# Patient Record
Sex: MALE | Employment: UNEMPLOYED | URBAN - METROPOLITAN AREA
[De-identification: names, ages, dates, MRNs, and addresses within clinical notes are randomized per-mention and may not be internally consistent; named-entity substitution may affect disease eponyms.]

---

## 2019-08-21 ENCOUNTER — HOSPITAL ENCOUNTER (EMERGENCY)
Facility: CLINIC | Age: 54
Discharge: HOME | End: 2019-08-21
Attending: EMERGENCY MEDICINE | Admitting: EMERGENCY MEDICINE

## 2019-08-21 VITALS
SYSTOLIC BLOOD PRESSURE: 154 MMHG | RESPIRATION RATE: 20 BRPM | OXYGEN SATURATION: 100 % | HEART RATE: 82 BPM | TEMPERATURE: 98.3 F | DIASTOLIC BLOOD PRESSURE: 106 MMHG

## 2019-08-21 DIAGNOSIS — H66.90 ACUTE OTITIS MEDIA, UNSPECIFIED OTITIS MEDIA TYPE: ICD-10-CM

## 2019-08-21 PROCEDURE — 99282 EMERGENCY DEPT VISIT SF MDM: CPT

## 2019-08-21 RX ORDER — CODEINE PHOSPHATE AND GUAIFENESIN 10; 100 MG/5ML; MG/5ML
2 SOLUTION ORAL EVERY 4 HOURS PRN
Qty: 120 ML | Refills: 0 | Status: SHIPPED | OUTPATIENT
Start: 2019-08-21

## 2019-08-21 RX ORDER — AMOXICILLIN 500 MG/1
500 CAPSULE ORAL 3 TIMES DAILY
Qty: 21 CAPSULE | Refills: 0 | Status: SHIPPED | OUTPATIENT
Start: 2019-08-21 | End: 2019-08-28

## 2019-08-21 ASSESSMENT — ENCOUNTER SYMPTOMS
SORE THROAT: 0
VOMITING: 0
BACK PAIN: 0
NAUSEA: 0
COUGH: 1
FEVER: 0

## 2019-08-21 NOTE — ED AVS SNAPSHOT
[facility] Emergency Department  [address]  [facility] [address]  Phone:  [phone]  Fax:  [phone]                                    Anibal Sanz   MRN: [id number]    Department:  [facility] Emergency Department   Date of Visit:  8/21/2019           After Visit Summary Signature Page    I have received my discharge instructions, and my questions have been answered. I have discussed any challenges I see with this plan with the nurse or doctor.    ..........................................................................................................................................  Patient/Patient Representative Signature      ..........................................................................................................................................  Patient Representative Print Name and Relationship to Patient    ..................................................               ................................................  Date                                   Time    ..........................................................................................................................................  Reviewed by Signature/Title    ...................................................              ..............................................  Date                                               Time          22EPIC Rev 08/18

## 2019-08-21 NOTE — ED PROVIDER NOTES
"  History     Chief Complaint:  Ear Fullness and Cough    The history is provided by the patient.      Anibal Sanz is a 54 year old male who presents with ear fullness and cough. Nine days ago (8/12/2019), patient flew into [place] from [facility] and states he had a sudden onset of left ear fullness and a cough with post-nasal drip a few days ago. Patient has tried saline drops and Flonase that has helped after his symptoms worsened. However, patient flies back to [place] in two weeks and would like to resolve his issues, prompting him to the ED. Here, patient denies chest pain, back pain, sore throat, fever, nausea, or vomiting. He denies congestion or illness before his flight here. No history of sinusitis. 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.     Allergies:  NKDA     Medications:    The patient is not currently taking any prescribed medications.    Past Medical History:    The patient denies any significant past medical history.    Past Surgical History:    The patient does not have any pertinent past surgical history.    Family History:    No past pertinent family history.    Social History:  Patient is from [facility].   Occupation: Ophthalmologist.      Review of Systems   Constitutional: Negative for fever.   HENT: Positive for postnasal drip. Negative for sore throat.         Positive for ear \"fullness\"     Respiratory: Positive for cough.    Cardiovascular: Negative for chest pain.   Gastrointestinal: Negative for nausea and vomiting.   Musculoskeletal: Negative for back pain.   All other systems reviewed and are negative.      Physical Exam     Patient Vitals for the past 24 hrs:   BP Temp Temp src Pulse Resp SpO2   08/21/19 1930 -- -- -- -- -- 100 %   08/21/19 1915 (!) 154/106 -- -- 82 -- 99 %   08/21/19 1900 (!) 167/108 -- -- 88 -- 100 %   08/21/19 1857 (!) 182/105 98.3  F (36.8  C) Oral 88 20 99 %     Physical Exam  Constitutional: Alert, attentive  HENT:     Nose: Nose normal.   Mouth/Throat: Oropharynx " is clear, mucous membranes are moist   Ears: Normal external ears. TM clear on the right; left with + effusion and erythema at the inferior aspect;normal external canals bilaterally.  Eyes: EOM are normal.    CV: Regular rate and rhythm, no murmurs, rubs or gallups.  Chest: Effort normal and breath sounds normal.   GI: No distension. There is no tenderness.  MSK: Normal range of motion.   Neurological: Alert, attentive  Skin: Skin is warm and dry.      Emergency Department Course   Emergency Department Course:  1924 Nursing notes and vitals reviewed. I performed an exam of the patient as documented above.     Findings and plan explained to the Patient. Patient discharged home with instructions regarding supportive care, medications, and reasons to return. The importance of close follow-up was reviewed. The patient was prescribed amoxicillin.     I personally reviewed and answered all related questions prior to discharge.     Impression & Plan      Medical Decision Making:  This is a 54-year-old male who presents with 9 days of rhinorrhea, cough, left ear fullness and pain.  Exam suggests left otitis media.  He has an otherwise normal cardiopulmonary exam and normal general exam.  Recommended amoxicillin for otitis media.  Primary care follow-up in 7 to 10 days versus ENT if symptoms are not resolving.  He should return immediately for worse pain, fever, or any other concerns.    Diagnosis:    ICD-10-CM    1. Acute otitis media, unspecified otitis media type H66.90        Disposition:  discharged to home    Discharge Medications:  New Prescriptions    AMOXICILLIN (AMOXIL) 500 MG CAPSULE    Take 1 capsule (500 mg) by mouth 3 times daily for 7 days     Scribe Disposition  I, Janelle Lopez, am serving as a scribe on 8/21/2019 at 7:17 PM to personally document services performed by William Rodriguez MD based on my observations and the provider's statements to me.     Janelle Lopez  8/21/2019   [facility]  EMERGENCY DEPARTMENT       William Rodriguez MD  08/21/19 9219

## 2019-08-22 NOTE — ED TRIAGE NOTES
"Arrives stating left ear is \"plugged\" and he is experiencing some hearing loss in that ear. Also states he has a cough. Alert and oriented, ABCs intact.  "

## 2019-08-22 NOTE — DISCHARGE INSTRUCTIONS
"Discharge Instructions  Otitis Media  You or your child have an ear infection known as otitis media or middle ear infection (otitis = ear, media = middle). These infections often develop after a viral infection, such as a cold. The cold causes swelling around the pressure-equalizing tube of the ear, which allows fluid to build up in the space behind the eardrum (the middle ear). This fluid build-up can trap bacteria and viruses and increase pressure on the eardrum causing pain. Symptoms of an ear infection can include earache/pain and decreased hearing loss. These symptoms often come on suddenly. For children, symptoms may include fever (temperature >100.4 F), pulling on the ear, fussiness, and decreased activity/appetite.  Generally, every Emergency Department visit should have a follow-up clinic visit with either a primary or a specialty clinic/provider. Please follow-up as instructed by your emergency provider today.    Return to the Emergency Department if:  Your child becomes very fussy or weak.  The symptoms get worse, or if you develop a severe headache, stiff neck, or new symptoms.    Treatment:  The \"best\" treatment depends on your age, history of previous infections, and any underlying medical problems.  Antibiotics are not given to every patient with an ear infection because studies show that many people with ear infections will improve without using antibiotics. Because antibiotics can have side effects such as diarrhea and stomach upset and can also cause severe allergic reactions, providers are trying to avoid using antibiotics if it is safe for the patient to do so.   In these cases, a prescription for antibiotics may be given to be filled in 24 -48 hours if symptoms are getting worse or not improving (this is often called  wait and see  treatment). If the symptoms are improving, the antibiotic does not need to be taken.   Remember, antibiotics do not treat pain.    Pain medications. You may take a " pain medication such as Tylenol  (acetaminophen), Advil  (ibuprofen), Nuprin  (ibuprofen) or Aleve  (naproxen).    Complications:    Tympanic membrane rupture - One possible complication of an ear infection is rupture of the tympanic membrane, or ear drum. This happens because of pressure on the tympanic membrane from the infected fluid. When the tympanic membrane ruptures, you may have pus or blood drain from the ear. It does not hurt when the membrane ruptures, and many people actually feel better because pressure is released. Fortunately, the tympanic membrane usually heals quickly after rupturing, within hours to days. You should keep water out of the ear until you re-check with your provider to be sure the ear drum has healed.     Mastoiditis - Rarely, the area behind the ear can become infected, this area is called the mastoid.  If you notice redness and swelling behind your ear, see your provider or return to the Emergency Department immediately.      Hearing loss - The fluid that collects behind the eardrum (called an effusion) can persist for weeks to months after the pain of an ear infection resolves. An effusion causes trouble hearing, which is usually temporary. If the fluid persists, however, it can interfere with the process of learning to speak.   For this reason, children under 2 need to be seen by their pediatrician WITHIN 3 MONTHS to ensure that the fluid has resolved.  If you were given a prescription for medicine here today, be sure to read all of the information (including the package insert) that comes with your prescription.  This will include important information about the medicine, its side effects, and any warnings that you need to know about.  The pharmacist who fills the prescription can provide more information and answer questions you may have about the medicine.  If you have questions or concerns that the pharmacist cannot address, please call or return to the Emergency Department.    Remember that you can always come back to the Emergency Department if you are not able to see your regular provider in the amount of time listed above, if you get any new symptoms, or if there is anything that worries you.

## 2021-12-07 ENCOUNTER — IMMUNIZATION (OUTPATIENT)
Dept: NURSING | Facility: CLINIC | Age: 56
End: 2021-12-07
Payer: OTHER GOVERNMENT

## 2021-12-07 PROCEDURE — 91300 PR COVID VAC PFIZER DIL RECON 30 MCG/0.3 ML IM: CPT

## 2021-12-07 PROCEDURE — 0004A PR COVID VAC PFIZER DIL RECON 30 MCG/0.3 ML IM: CPT
